# Patient Record
Sex: MALE | Race: WHITE | NOT HISPANIC OR LATINO | Employment: PART TIME | URBAN - METROPOLITAN AREA
[De-identification: names, ages, dates, MRNs, and addresses within clinical notes are randomized per-mention and may not be internally consistent; named-entity substitution may affect disease eponyms.]

---

## 2021-06-27 ENCOUNTER — HOSPITAL ENCOUNTER (EMERGENCY)
Facility: CLINIC | Age: 26
Discharge: HOME | End: 2021-06-27
Attending: FAMILY MEDICINE | Admitting: FAMILY MEDICINE
Payer: COMMERCIAL

## 2021-06-27 ENCOUNTER — APPOINTMENT (OUTPATIENT)
Dept: GENERAL RADIOLOGY | Facility: CLINIC | Age: 26
End: 2021-06-27
Attending: FAMILY MEDICINE
Payer: COMMERCIAL

## 2021-06-27 VITALS
RESPIRATION RATE: 15 BRPM | TEMPERATURE: 98.1 F | OXYGEN SATURATION: 97 % | SYSTOLIC BLOOD PRESSURE: 110 MMHG | DIASTOLIC BLOOD PRESSURE: 76 MMHG | HEIGHT: 70 IN | HEART RATE: 84 BPM | BODY MASS INDEX: 22.19 KG/M2 | WEIGHT: 155 LBS

## 2021-06-27 DIAGNOSIS — S93.104A DISLOCATED TOE, RIGHT, INITIAL ENCOUNTER: ICD-10-CM

## 2021-06-27 PROCEDURE — 28660 TREAT TOE DISLOCATION: CPT | Mod: 54 | Performed by: FAMILY MEDICINE

## 2021-06-27 PROCEDURE — 73660 X-RAY EXAM OF TOE(S): CPT | Mod: RT

## 2021-06-27 PROCEDURE — 999N000065 XR TOE PORT RIGHT G/E 2 VIEWS: Mod: RT

## 2021-06-27 PROCEDURE — 28660 TREAT TOE DISLOCATION: CPT | Mod: T9 | Performed by: FAMILY MEDICINE

## 2021-06-27 PROCEDURE — 99284 EMERGENCY DEPT VISIT MOD MDM: CPT | Mod: 25 | Performed by: FAMILY MEDICINE

## 2021-06-27 ASSESSMENT — MIFFLIN-ST. JEOR: SCORE: 1699.33

## 2021-06-27 NOTE — ED PROVIDER NOTES
"  History     Chief Complaint   Patient presents with     Toe Injury     HPI  Thuan Fuentes is a 25 year old male who presents with concerns of right fifth toe pain.  Patient states that he was doing a cartwheel when he landed he felt his foot broke and displaced medially.  Since then he has had increasing pain over the area, and swelling.  Denies previously broken any bones in his foot.  Movement makes the pain worse, he has been able to walk on it though.  Denies any other injuries.    Allergies:  Allergies   Allergen Reactions     Penicillins Hives and Rash     Rash: Rash  ITCHING: Itchin, Rash: Rash         Problem List:    There are no active problems to display for this patient.       Past Medical History:    No past medical history on file.    Past Surgical History:    No past surgical history on file.    Family History:    No family history on file.    Social History:  Marital Status:  Single [1]  Social History     Tobacco Use     Smoking status: Not on file   Substance Use Topics     Alcohol use: Not on file     Drug use: Not on file        Medications:    No current outpatient medications on file.        Review of Systems   All other systems reviewed and are negative.      Physical Exam   BP: 110/76  Pulse: 84  Temp: 98.1  F (36.7  C)  Resp: 15  Height: 177.8 cm (5' 10\")  Weight: 70.3 kg (155 lb)  SpO2: 97 %      Physical Exam  Vitals signs and nursing note reviewed.   Constitutional:       General: He is not in acute distress.     Appearance: Normal appearance. He is not ill-appearing.   Musculoskeletal:      Right foot: Decreased range of motion. Normal capillary refill. Tenderness, bony tenderness, swelling and deformity present. No crepitus or laceration.        Feet:    Neurological:      Mental Status: He is alert.         ED Course        [facility]    -Dislocation - Lower Extremity    Date/Time: 6/27/2021 2:39 PM  Performed by: Catrachito Brown, " MD  Authorized by: Catrachito Brown MD       LOCATION     Location:  Toe    Toe injury location:  Small toe    Small toe joint:  R small DIP    PRE PROCEDURE DETAILS:     Distal perfusion: normal      Range of motion: reduced    ANESTHESIA (see MAR for exact dosages)      Anesthesia method:  Nerve block    Block anesthetic:  Lidocaine 1% w/o epi    Block injection procedure:  Anatomic landmarks identified    Block outcome:  Anesthesia achieved    PROCEDURE DETAILS      Manipulation performed: yes      Toe reduction method:  Traction and counter traction and downward posterior pressure    Reduction successful: yes      Reduction confirmed with imaging: yes      Immobilization:  Strapping    POST PROCEDURE DETAILS      Neurological function: normal      Distal perfusion: normal      Range of motion: improved                  Results for orders placed or performed during the hospital encounter of 06/27/21 (from the past 24 hour(s))   XR Toe Right G/E 2 Views    Narrative    EXAM: XR TOE RIGHT G/E 2 VIEWS  LOCATION: [facility]  DATE/TIME: 06/27/2021, 1:19 PM    INDICATION: Fall. Toe trauma.  COMPARISON: None.      Impression    IMPRESSION: Dislocated right fifth toe PIP joint. Apparent dorsal displacement of the fifth toe middle phalanx relative to the proximal phalanx. Mild overriding. Adjacent soft tissue swelling. No acute displaced fracture identified.     XR Toe Port Right G/E 2 Views    Narrative    EXAM: XR TOE PORTABLE RIGHT G/E 2 VIEWS  LOCATION: [facility]  DATE/TIME: 06/27/2021, 2:20 PM    INDICATION: Post reduction.  COMPARISON: 06/27/2021.      Impression    IMPRESSION: Complete reduction of the previously seen right fifth toe PIP joint dislocation. No right fifth toe fracture identified. Joint spaces normal. Fifth toe soft tissue swelling.         Medications   lidocaine 1 % 10 mL (has no administration in time range)     X-ray shows dislocation of the DIP of the right  little toe.  Toe was reduced as noted above.  X-ray shows good reduction.  We will discharge the patient home with moises taping.  Patient will follow with podiatry.    Assessments & Plan (with Medical Decision Making)  Right fifth toe dislocation     I have reviewed the nursing notes.    I have reviewed the findings, diagnosis, plan and need for follow up with the patient.        6/27/2021   [facility] EMERGENCY DEPT     Catrachito Brown MD  06/27/21 [id number]

## 2021-06-27 NOTE — ED TRIAGE NOTES
"Was doing a one handed cart wheel yesterday and fell landing on his pinky toe and states now cannot bear much weight to the right foot.  States he can \"feel the bones shifting\"  "

## 2021-11-09 ENCOUNTER — OFFICE VISIT (OUTPATIENT)
Dept: FAMILY MEDICINE | Facility: CLINIC | Age: 26
End: 2021-11-09
Payer: COMMERCIAL

## 2021-11-09 VITALS
RESPIRATION RATE: 18 BRPM | TEMPERATURE: 98.1 F | WEIGHT: 157 LBS | SYSTOLIC BLOOD PRESSURE: 104 MMHG | HEART RATE: 78 BPM | DIASTOLIC BLOOD PRESSURE: 64 MMHG | BODY MASS INDEX: 22.53 KG/M2 | OXYGEN SATURATION: 98 %

## 2021-11-09 DIAGNOSIS — Z76.89 ENCOUNTER TO ESTABLISH CARE: Primary | ICD-10-CM

## 2021-11-09 DIAGNOSIS — F41.1 GAD (GENERALIZED ANXIETY DISORDER): ICD-10-CM

## 2021-11-09 PROCEDURE — 99204 OFFICE O/P NEW MOD 45 MIN: CPT | Performed by: STUDENT IN AN ORGANIZED HEALTH CARE EDUCATION/TRAINING PROGRAM

## 2021-11-09 RX ORDER — CITALOPRAM HYDROBROMIDE 20 MG/1
20 TABLET ORAL DAILY
Qty: 60 TABLET | Refills: 5 | Status: SHIPPED | OUTPATIENT
Start: 2021-11-09 | End: 2021-12-24

## 2021-11-09 ASSESSMENT — PAIN SCALES - GENERAL: PAINLEVEL: NO PAIN (0)

## 2021-11-09 ASSESSMENT — PATIENT HEALTH QUESTIONNAIRE - PHQ9: 5. POOR APPETITE OR OVEREATING: MORE THAN HALF THE DAYS

## 2021-11-09 ASSESSMENT — ANXIETY QUESTIONNAIRES
7. FEELING AFRAID AS IF SOMETHING AWFUL MIGHT HAPPEN: NEARLY EVERY DAY
IF YOU CHECKED OFF ANY PROBLEMS ON THIS QUESTIONNAIRE, HOW DIFFICULT HAVE THESE PROBLEMS MADE IT FOR YOU TO DO YOUR WORK, TAKE CARE OF THINGS AT HOME, OR GET ALONG WITH OTHER PEOPLE: VERY DIFFICULT
6. BECOMING EASILY ANNOYED OR IRRITABLE: SEVERAL DAYS
1. FEELING NERVOUS, ANXIOUS, OR ON EDGE: NEARLY EVERY DAY
5. BEING SO RESTLESS THAT IT IS HARD TO SIT STILL: NEARLY EVERY DAY
GAD7 TOTAL SCORE: 17
3. WORRYING TOO MUCH ABOUT DIFFERENT THINGS: NEARLY EVERY DAY
2. NOT BEING ABLE TO STOP OR CONTROL WORRYING: MORE THAN HALF THE DAYS

## 2021-11-09 NOTE — PROGRESS NOTES
Assessment & Plan     Encounter to establish care  History reviewed and updated    KEM (generalized anxiety disorder)  We will plan to start citalopram 20 mg daily and see how this goes.  I did encourage counseling but he is not willing to see them at this time.  He will continue to work on seeing when his anxiety is worse.  We will plan to follow-up in 1 month and see how things are going.  - citalopram (CELEXA) 20 MG tablet  Dispense: 60 tablet; Refill: 5      Return in about 4 weeks (around 2021) for Follow up anxiety.    Enzo Che MD  [facility]    Peter Larose is a 26 year old who presents for the following health issues      HPI     Anxiety Follow-Up    How are you doing with your anxiety since your last visit? Worsened     Are you having other symptoms that might be associated with anxiety? Yes:  hard to stay focused, mind won't shut down at night    Have you had a significant life event? OTHER: moved, had to find new job     Are you feeling depressed? No    Do you have any concerns with your use of alcohol or other drugs? No    Does have history of anxiety and depression.  Was previously treated in the past and seen counseling.  He is not interested in seeing counseling now.  Was on Zoloft in the past which he does not like how he felt on it.  Also use Atarax as needed but unsure if this helped.  He feels like his mood and depression has been fairly well controlled but anxiety has been his main issue.  No other changes or substance use.  Did quit smoking several months ago which I congratulated him on.    Kem of 17 today and PHQ-9 of 12    Social History     Tobacco Use     Smoking status: Former Smoker     Packs/day: 1.00     Years: 7.00     Pack years: 7.00     Quit date: 2021     Years since quittin.2     Smokeless tobacco: None   Vaping Use     Vaping Use: Every day   Substance Use Topics     Alcohol use: None     Drug use: None     No flowsheet  data found.  No flowsheet data found.  No flowsheet data found.        Review of Systems   Constitutional, HEENT, cardiovascular, pulmonary, gi and gu systems are negative, except as otherwise noted.      Objective    /64 (Cuff Size: Adult Regular)   Pulse 78   Temp 98.1  F (36.7  C) (Temporal)   Resp 18   Wt 71.2 kg (157 lb)   SpO2 98%   BMI 22.53 kg/m    Body mass index is 22.53 kg/m .  Physical Exam   GENERAL: healthy, alert and no distress  EYES: Eyes grossly normal to inspection, PERRL and conjunctivae and sclerae normal  HENT: hearing intact  RESP: lungs clear to auscultation - no rales, rhonchi or wheezes  CV: regular rate and rhythm, normal S1 S2, no S3 or S4, no murmur, click or rub, no peripheral edema and peripheral pulses strong  MS: no gross musculoskeletal defects noted, no edema  SKIN: no suspicious lesions or rashes  NEURO: Normal strength and tone, mentation intact and speech normal  PSYCH: mentation appears normal, affect normal/bright

## 2021-11-10 ASSESSMENT — ANXIETY QUESTIONNAIRES: GAD7 TOTAL SCORE: 17

## 2021-11-10 ASSESSMENT — PATIENT HEALTH QUESTIONNAIRE - PHQ9: SUM OF ALL RESPONSES TO PHQ QUESTIONS 1-9: 14

## 2021-12-24 ENCOUNTER — VIRTUAL VISIT (OUTPATIENT)
Dept: FAMILY MEDICINE | Facility: CLINIC | Age: 26
End: 2021-12-24
Payer: COMMERCIAL

## 2021-12-24 DIAGNOSIS — F41.1 GAD (GENERALIZED ANXIETY DISORDER): Primary | ICD-10-CM

## 2021-12-24 DIAGNOSIS — F32.0 CURRENT MILD EPISODE OF MAJOR DEPRESSIVE DISORDER, UNSPECIFIED WHETHER RECURRENT (H): ICD-10-CM

## 2021-12-24 PROCEDURE — 99213 OFFICE O/P EST LOW 20 MIN: CPT | Mod: GT | Performed by: STUDENT IN AN ORGANIZED HEALTH CARE EDUCATION/TRAINING PROGRAM

## 2021-12-24 ASSESSMENT — ANXIETY QUESTIONNAIRES
6. BECOMING EASILY ANNOYED OR IRRITABLE: NOT AT ALL
IF YOU CHECKED OFF ANY PROBLEMS ON THIS QUESTIONNAIRE, HOW DIFFICULT HAVE THESE PROBLEMS MADE IT FOR YOU TO DO YOUR WORK, TAKE CARE OF THINGS AT HOME, OR GET ALONG WITH OTHER PEOPLE: SOMEWHAT DIFFICULT
GAD7 TOTAL SCORE: 9
2. NOT BEING ABLE TO STOP OR CONTROL WORRYING: SEVERAL DAYS
7. FEELING AFRAID AS IF SOMETHING AWFUL MIGHT HAPPEN: NOT AT ALL
3. WORRYING TOO MUCH ABOUT DIFFERENT THINGS: NEARLY EVERY DAY
5. BEING SO RESTLESS THAT IT IS HARD TO SIT STILL: SEVERAL DAYS
1. FEELING NERVOUS, ANXIOUS, OR ON EDGE: NEARLY EVERY DAY

## 2021-12-24 ASSESSMENT — PAIN SCALES - GENERAL: PAINLEVEL: NO PAIN (0)

## 2021-12-24 ASSESSMENT — PATIENT HEALTH QUESTIONNAIRE - PHQ9: 5. POOR APPETITE OR OVEREATING: SEVERAL DAYS

## 2021-12-24 NOTE — PROGRESS NOTES
Thuan is a 26 year old who is being evaluated via a billable video visit.      How would you like to obtain your AVS? MyChart  If the video visit is dropped, the invitation should be resent by: Text to cell phone: [phone]  Will anyone else be joining your video visit? No      Video Start Time: 8:43 AM    Assessment & Plan     KEM (generalized anxiety disorder)  Will plan for him to see counseling. He would like to avoid other medications for the time being. If things are not improving or he decides he would like to try something different then he will let me know. Plan to continue to talk openly with family and I will provide letter for support cat which he thinks has been helpful. He will follow up as needed or for general physical in the future.   - Adult Mental Health Referral    Current mild episode of major depressive disorder, unspecified whether recurrent (H)          Return if symptoms worsen or fail to improve, for Routine preventive.    Enzo Che MD  [employer]   Thuan is a 26 year old who presents for the following health issues     HPI     Anxiety Follow-Up    How are you doing with your anxiety since your last visit? Improved     Are you having other symptoms that might be associated with anxiety? No    Have you had a significant life event? No     Are you feeling depressed? Yes:  mild    Do you have any concerns with your use of alcohol or other drugs? No    Social History     Tobacco Use     Smoking status: Former Smoker     Packs/day: 1.00     Years: 7.00     Pack years: 7.00     Quit date: 2021     Years since quittin.3     Smokeless tobacco: Never Used   Vaping Use     Vaping Use: Every day     Substances: Nicotine   Substance Use Topics     Alcohol use: Yes     Drug use: Never     KEM-7 SCORE 2021   Total Score 17 9     PHQ 2021   PHQ-9 Total Score 14   Q9: Thoughts of better off dead/self-harm past 2 weeks Not  at all     KEM-7  12/24/2021   1. Feeling nervous, anxious, or on edge 3   2. Not being able to stop or control worrying 1   3. Worrying too much about different things 3   4. Trouble relaxing 1   5. Being so restless that it is hard to sit still 1   6. Becoming easily annoyed or irritable 0   7. Feeling afraid, as if something awful might happen 0   KEM-7 Total Score 9   If you checked any problems, how difficult have they made it for you to do your work, take care of things at home, or get along with other people? Somewhat difficult     Feels like medicine med him anti-social. He tried it for 2 days and then discontinued this. Thinks anxiety and depression are slightly improved since last visit. Has been talking with family more often but no other changes since our last visit. Gets stressed out easily but no new stressors for him. No thoughts of hurting self or others. He would like letter for cat for his apartment. He has found her very calming.     Review of Systems   Constitutional, HEENT, cardiovascular, pulmonary, gi and gu systems are negative, except as otherwise noted.      Objective    Vitals - Patient Reported  Pain Score: No Pain (0)      Vitals:  No vitals were obtained today due to virtual visit.    Physical Exam   GENERAL: Healthy, alert and no distress  EYES: Eyes grossly normal to inspection.  No discharge or erythema, or obvious scleral/conjunctival abnormalities.  RESP: No audible wheeze, cough, or visible cyanosis.  No visible retractions or increased work of breathing.    SKIN: Visible skin clear. No significant rash, abnormal pigmentation or lesions.  NEURO: Cranial nerves grossly intact.  Mentation and speech appropriate for age.  PSYCH: Mentation appears normal, affect normal/bright, judgement and insight intact, normal speech and appearance well-groomed.        Video-Visit Details    Type of service:  Video Visit    Video End Time:8:51 AM    Originating Location (pt. Location):  Home    Distant Location (provider location):  [facility]     Platform used for Video Visit: Chrissy

## 2021-12-24 NOTE — LETTER
December 24, 2021      Thuan Fuentes  [address]   [place] [facility] [place] [address]        To Whom It May Concern:    Thuan Fuentes was seen in our clinic. He is currently being treated for anxiety and would benefit from having a cat as a support animal in the future. If this could be accommodated that would be greatly appreciated. Please contact our clinic with further questions.       Sincerely,        Enzo Che MD

## 2021-12-25 ASSESSMENT — ANXIETY QUESTIONNAIRES: GAD7 TOTAL SCORE: 9

## 2021-12-27 ENCOUNTER — APPOINTMENT (OUTPATIENT)
Dept: CT IMAGING | Facility: CLINIC | Age: 26
End: 2021-12-27
Attending: STUDENT IN AN ORGANIZED HEALTH CARE EDUCATION/TRAINING PROGRAM
Payer: COMMERCIAL

## 2021-12-27 ENCOUNTER — HOSPITAL ENCOUNTER (EMERGENCY)
Facility: CLINIC | Age: 26
Discharge: HOME | End: 2021-12-27
Attending: STUDENT IN AN ORGANIZED HEALTH CARE EDUCATION/TRAINING PROGRAM | Admitting: STUDENT IN AN ORGANIZED HEALTH CARE EDUCATION/TRAINING PROGRAM
Payer: COMMERCIAL

## 2021-12-27 VITALS
TEMPERATURE: 99.5 F | BODY MASS INDEX: 22.24 KG/M2 | RESPIRATION RATE: 18 BRPM | SYSTOLIC BLOOD PRESSURE: 103 MMHG | DIASTOLIC BLOOD PRESSURE: 44 MMHG | HEART RATE: 102 BPM | OXYGEN SATURATION: 99 % | WEIGHT: 155 LBS

## 2021-12-27 DIAGNOSIS — R51.9 HEADACHE, UNSPECIFIED HEADACHE TYPE: ICD-10-CM

## 2021-12-27 DIAGNOSIS — U07.1 COVID-19 VIRUS INFECTION: ICD-10-CM

## 2021-12-27 PROBLEM — R11.14 BILIOUS VOMITING WITH NAUSEA: Status: ACTIVE | Noted: 2017-07-28

## 2021-12-27 PROBLEM — R10.13 EPIGASTRIC PAIN: Status: ACTIVE | Noted: 2017-07-28

## 2021-12-27 LAB
ALBUMIN SERPL-MCNC: 3.7 G/DL (ref 3.4–5)
ALP SERPL-CCNC: 64 U/L (ref 40–150)
ALT SERPL W P-5'-P-CCNC: 44 U/L (ref 0–70)
ANION GAP SERPL CALCULATED.3IONS-SCNC: 7 MMOL/L (ref 3–14)
AST SERPL W P-5'-P-CCNC: 28 U/L (ref 0–45)
BASOPHILS # BLD AUTO: 0 10E3/UL (ref 0–0.2)
BASOPHILS NFR BLD AUTO: 0 %
BILIRUB SERPL-MCNC: 1.1 MG/DL (ref 0.2–1.3)
BUN SERPL-MCNC: 12 MG/DL (ref 7–30)
CALCIUM SERPL-MCNC: 8.9 MG/DL (ref 8.5–10.1)
CHLORIDE BLD-SCNC: 109 MMOL/L (ref 94–109)
CO2 SERPL-SCNC: 25 MMOL/L (ref 20–32)
CREAT SERPL-MCNC: 1.04 MG/DL (ref 0.66–1.25)
EOSINOPHIL # BLD AUTO: 0 10E3/UL (ref 0–0.7)
EOSINOPHIL NFR BLD AUTO: 0 %
ERYTHROCYTE [DISTWIDTH] IN BLOOD BY AUTOMATED COUNT: 13.6 % (ref 10–15)
FLUAV RNA SPEC QL NAA+PROBE: NEGATIVE
FLUBV RNA RESP QL NAA+PROBE: NEGATIVE
GFR SERPL CREATININE-BSD FRML MDRD: >90 ML/MIN/1.73M2
GLUCOSE BLD-MCNC: 85 MG/DL (ref 70–99)
HCT VFR BLD AUTO: 38.6 % (ref 40–53)
HGB BLD-MCNC: 13.2 G/DL (ref 13.3–17.7)
IMM GRANULOCYTES # BLD: 0 10E3/UL
IMM GRANULOCYTES NFR BLD: 0 %
LYMPHOCYTES # BLD AUTO: 1.5 10E3/UL (ref 0.8–5.3)
LYMPHOCYTES NFR BLD AUTO: 20 %
MCH RBC QN AUTO: 30.7 PG (ref 26.5–33)
MCHC RBC AUTO-ENTMCNC: 34.2 G/DL (ref 31.5–36.5)
MCV RBC AUTO: 90 FL (ref 78–100)
MONOCYTES # BLD AUTO: 0.9 10E3/UL (ref 0–1.3)
MONOCYTES NFR BLD AUTO: 12 %
NEUTROPHILS # BLD AUTO: 5 10E3/UL (ref 1.6–8.3)
NEUTROPHILS NFR BLD AUTO: 68 %
NRBC # BLD AUTO: 0 10E3/UL
NRBC BLD AUTO-RTO: 0 /100
PLATELET # BLD AUTO: 150 10E3/UL (ref 150–450)
POTASSIUM BLD-SCNC: 3.7 MMOL/L (ref 3.4–5.3)
PROT SERPL-MCNC: 6.8 G/DL (ref 6.8–8.8)
RBC # BLD AUTO: 4.3 10E6/UL (ref 4.4–5.9)
SARS-COV-2 RNA RESP QL NAA+PROBE: POSITIVE
SODIUM SERPL-SCNC: 141 MMOL/L (ref 133–144)
WBC # BLD AUTO: 7.4 10E3/UL (ref 4–11)

## 2021-12-27 PROCEDURE — 36415 COLL VENOUS BLD VENIPUNCTURE: CPT | Performed by: STUDENT IN AN ORGANIZED HEALTH CARE EDUCATION/TRAINING PROGRAM

## 2021-12-27 PROCEDURE — 250N000011 HC RX IP 250 OP 636: Performed by: STUDENT IN AN ORGANIZED HEALTH CARE EDUCATION/TRAINING PROGRAM

## 2021-12-27 PROCEDURE — C9803 HOPD COVID-19 SPEC COLLECT: HCPCS | Performed by: STUDENT IN AN ORGANIZED HEALTH CARE EDUCATION/TRAINING PROGRAM

## 2021-12-27 PROCEDURE — 99285 EMERGENCY DEPT VISIT HI MDM: CPT | Mod: 25 | Performed by: STUDENT IN AN ORGANIZED HEALTH CARE EDUCATION/TRAINING PROGRAM

## 2021-12-27 PROCEDURE — 87636 SARSCOV2 & INF A&B AMP PRB: CPT | Performed by: STUDENT IN AN ORGANIZED HEALTH CARE EDUCATION/TRAINING PROGRAM

## 2021-12-27 PROCEDURE — 80053 COMPREHEN METABOLIC PANEL: CPT | Performed by: STUDENT IN AN ORGANIZED HEALTH CARE EDUCATION/TRAINING PROGRAM

## 2021-12-27 PROCEDURE — 70496 CT ANGIOGRAPHY HEAD: CPT

## 2021-12-27 PROCEDURE — 96361 HYDRATE IV INFUSION ADD-ON: CPT | Performed by: STUDENT IN AN ORGANIZED HEALTH CARE EDUCATION/TRAINING PROGRAM

## 2021-12-27 PROCEDURE — 99284 EMERGENCY DEPT VISIT MOD MDM: CPT | Performed by: STUDENT IN AN ORGANIZED HEALTH CARE EDUCATION/TRAINING PROGRAM

## 2021-12-27 PROCEDURE — 258N000003 HC RX IP 258 OP 636: Performed by: STUDENT IN AN ORGANIZED HEALTH CARE EDUCATION/TRAINING PROGRAM

## 2021-12-27 PROCEDURE — 96375 TX/PRO/DX INJ NEW DRUG ADDON: CPT | Mod: 59 | Performed by: STUDENT IN AN ORGANIZED HEALTH CARE EDUCATION/TRAINING PROGRAM

## 2021-12-27 PROCEDURE — 250N000013 HC RX MED GY IP 250 OP 250 PS 637: Performed by: EMERGENCY MEDICINE

## 2021-12-27 PROCEDURE — 250N000009 HC RX 250: Performed by: STUDENT IN AN ORGANIZED HEALTH CARE EDUCATION/TRAINING PROGRAM

## 2021-12-27 PROCEDURE — 96374 THER/PROPH/DIAG INJ IV PUSH: CPT | Mod: 59 | Performed by: STUDENT IN AN ORGANIZED HEALTH CARE EDUCATION/TRAINING PROGRAM

## 2021-12-27 PROCEDURE — 85025 COMPLETE CBC W/AUTO DIFF WBC: CPT | Performed by: STUDENT IN AN ORGANIZED HEALTH CARE EDUCATION/TRAINING PROGRAM

## 2021-12-27 RX ORDER — IOPAMIDOL 755 MG/ML
500 INJECTION, SOLUTION INTRAVASCULAR ONCE
Status: COMPLETED | OUTPATIENT
Start: 2021-12-27 | End: 2021-12-27

## 2021-12-27 RX ORDER — ACETAMINOPHEN 500 MG
1000 TABLET ORAL ONCE
Status: COMPLETED | OUTPATIENT
Start: 2021-12-27 | End: 2021-12-27

## 2021-12-27 RX ORDER — DIPHENHYDRAMINE HYDROCHLORIDE 50 MG/ML
25 INJECTION INTRAMUSCULAR; INTRAVENOUS ONCE
Status: COMPLETED | OUTPATIENT
Start: 2021-12-27 | End: 2021-12-27

## 2021-12-27 RX ORDER — KETOROLAC TROMETHAMINE 15 MG/ML
15 INJECTION, SOLUTION INTRAMUSCULAR; INTRAVENOUS ONCE
Status: COMPLETED | OUTPATIENT
Start: 2021-12-27 | End: 2021-12-27

## 2021-12-27 RX ADMIN — ACETAMINOPHEN 1000 MG: 500 TABLET, FILM COATED ORAL at 16:32

## 2021-12-27 RX ADMIN — DIPHENHYDRAMINE HYDROCHLORIDE 25 MG: 50 INJECTION, SOLUTION INTRAMUSCULAR; INTRAVENOUS at 19:15

## 2021-12-27 RX ADMIN — SODIUM CHLORIDE 60 ML: 9 INJECTION, SOLUTION INTRAVENOUS at 20:30

## 2021-12-27 RX ADMIN — IOPAMIDOL 70 ML: 755 INJECTION, SOLUTION INTRAVENOUS at 20:29

## 2021-12-27 RX ADMIN — KETOROLAC TROMETHAMINE 15 MG: 15 INJECTION, SOLUTION INTRAMUSCULAR; INTRAVENOUS at 19:24

## 2021-12-27 RX ADMIN — PROCHLORPERAZINE EDISYLATE 10 MG: 5 INJECTION INTRAMUSCULAR; INTRAVENOUS at 19:18

## 2021-12-27 RX ADMIN — SODIUM CHLORIDE 1000 ML: 9 INJECTION, SOLUTION INTRAVENOUS at 19:07

## 2021-12-27 NOTE — ED TRIAGE NOTES
Pt presents with bad headache, sore throat, chills and body aches. Symptoms started this morning.

## 2022-02-06 ENCOUNTER — HEALTH MAINTENANCE LETTER (OUTPATIENT)
Age: 27
End: 2022-02-06

## 2022-04-01 ENCOUNTER — MYC MEDICAL ADVICE (OUTPATIENT)
Dept: FAMILY MEDICINE | Facility: CLINIC | Age: 27
End: 2022-04-01
Payer: COMMERCIAL

## 2022-07-20 ENCOUNTER — OFFICE VISIT (OUTPATIENT)
Dept: FAMILY MEDICINE | Facility: CLINIC | Age: 27
End: 2022-07-20
Payer: COMMERCIAL

## 2022-07-20 VITALS
BODY MASS INDEX: 22.9 KG/M2 | WEIGHT: 163.6 LBS | OXYGEN SATURATION: 97 % | HEART RATE: 100 BPM | HEIGHT: 71 IN | SYSTOLIC BLOOD PRESSURE: 104 MMHG | TEMPERATURE: 98.7 F | RESPIRATION RATE: 14 BRPM | DIASTOLIC BLOOD PRESSURE: 62 MMHG

## 2022-07-20 DIAGNOSIS — T14.8XXA PUNCTURE WOUND: Primary | ICD-10-CM

## 2022-07-20 PROCEDURE — 99212 OFFICE O/P EST SF 10 MIN: CPT | Mod: 25 | Performed by: STUDENT IN AN ORGANIZED HEALTH CARE EDUCATION/TRAINING PROGRAM

## 2022-07-20 PROCEDURE — 90471 IMMUNIZATION ADMIN: CPT | Performed by: STUDENT IN AN ORGANIZED HEALTH CARE EDUCATION/TRAINING PROGRAM

## 2022-07-20 PROCEDURE — 90715 TDAP VACCINE 7 YRS/> IM: CPT | Performed by: STUDENT IN AN ORGANIZED HEALTH CARE EDUCATION/TRAINING PROGRAM

## 2022-07-20 ASSESSMENT — PATIENT HEALTH QUESTIONNAIRE - PHQ9
SUM OF ALL RESPONSES TO PHQ QUESTIONS 1-9: 0
SUM OF ALL RESPONSES TO PHQ QUESTIONS 1-9: 0
10. IF YOU CHECKED OFF ANY PROBLEMS, HOW DIFFICULT HAVE THESE PROBLEMS MADE IT FOR YOU TO DO YOUR WORK, TAKE CARE OF THINGS AT HOME, OR GET ALONG WITH OTHER PEOPLE: NOT DIFFICULT AT ALL

## 2022-07-20 ASSESSMENT — PAIN SCALES - GENERAL: PAINLEVEL: NO PAIN (0)

## 2022-07-20 NOTE — PROGRESS NOTES
"  Assessment & Plan     Puncture wound  Patient now 5 days out appears well-healing.  Does not appear to have any other underlying vascular or nerve damage with puncture wound and I do not think it was very deep.  Tetanus updated today.  No need for empiric antibiotics.  Follow-up with new or worsening symptoms.  Further signs of infection were discussed.    Enzo Che MD  [facility] ADRI Larose is a 26 year old, presenting for the following health issues:  Foot Injury (Stepped on a screw)      History of Present Illness       Reason for visit:  Stepped on a screw  Symptom onset:  1-3 days ago  Symptoms include:  Hurt foot due to screw  Symptom intensity:  Mild  Symptom progression:  Improving  Had these symptoms before:  No  What makes it worse:  Stepping on another screw  What makes it better:  Ice cream    He eats 0-1 servings of fruits and vegetables daily.He consumes 0 sweetened beverage(s) daily.He exercises with enough effort to increase his heart rate 30 to 60 minutes per day.  He exercises with enough effort to increase his heart rate 4 days per week.   He is taking medications regularly.    Today's PHQ-9         PHQ-9 Total Score: 0    PHQ-9 Q9 Thoughts of better off dead/self-harm past 2 weeks :   Not at all    How difficult have these problems made it for you to do your work, take care of things at home, or get along with other people: Not difficult at all       Patient symptoms through that with through his flip-flop and punctured the skin.  Does not think this went very deep.  Happened 5 days ago.    Review of Systems   Constitutional, HEENT, cardiovascular, pulmonary, gi and gu systems are negative, except as otherwise noted.      Objective    /62 (BP Location: Right arm, Patient Position: Sitting, Cuff Size: Adult Regular)   Pulse 100   Temp 98.7  F (37.1  C) (Temporal)   Resp 14   Ht 1.803 m (5' 11\")   Wt 74.2 kg (163 lb 9.6 oz)   SpO2 97%   " BMI 22.82 kg/m    Body mass index is 22.82 kg/m .  Physical Exam   GENERAL: healthy, alert and no distress  EYES: Eyes grossly normal to inspection, PERRL and conjunctivae and sclerae normal  RESP: breathing comfortable   CV: no peripheral edema and peripheral pulses strong  MS: no gross musculoskeletal defects noted, no edema, ROM of toes and foot intact, normal sensation and capillary refill of right lower extremity.   SKIN: right second metatarsal puncture wound at plantar surface that appears well healing without redness, tenderness or discharge.   NEURO: Normal strength and tone, mentation intact and speech normal  PSYCH: mentation appears normal, affect normal/bright          .  ..

## 2022-10-03 ENCOUNTER — HEALTH MAINTENANCE LETTER (OUTPATIENT)
Age: 27
End: 2022-10-03

## 2022-10-12 ENCOUNTER — OFFICE VISIT (OUTPATIENT)
Dept: FAMILY MEDICINE | Facility: CLINIC | Age: 27
End: 2022-10-12
Payer: COMMERCIAL

## 2022-10-12 VITALS
SYSTOLIC BLOOD PRESSURE: 114 MMHG | TEMPERATURE: 98.2 F | WEIGHT: 171 LBS | OXYGEN SATURATION: 100 % | DIASTOLIC BLOOD PRESSURE: 72 MMHG | BODY MASS INDEX: 23.85 KG/M2 | HEART RATE: 110 BPM

## 2022-10-12 DIAGNOSIS — F32.0 CURRENT MILD EPISODE OF MAJOR DEPRESSIVE DISORDER, UNSPECIFIED WHETHER RECURRENT (H): ICD-10-CM

## 2022-10-12 DIAGNOSIS — M67.431 GANGLION CYST OF WRIST, RIGHT: Primary | ICD-10-CM

## 2022-10-12 PROCEDURE — 99213 OFFICE O/P EST LOW 20 MIN: CPT | Performed by: PHYSICIAN ASSISTANT

## 2022-10-12 ASSESSMENT — PAIN SCALES - GENERAL: PAINLEVEL: NO PAIN (0)

## 2022-10-12 NOTE — PROGRESS NOTES
Peter Larose is a 27 year old, presenting for the following health issues:  Musculoskeletal Problem      Musculoskeletal Problem    History of Present Illness       Reason for visit:  Sore forearm  Symptom onset:  1-2 weeks ago  Symptom intensity:  Mild  Symptom progression:  Worsening  Had these symptoms before:  No  What makes it worse:  Lifting things mainly using fngers  What makes it better:  No    He eats 0-1 servings of fruits and vegetables daily.He consumes 1 sweetened beverage(s) daily.He exercises with enough effort to increase his heart rate 10 to 19 minutes per day.  He exercises with enough effort to increase his heart rate 4 days per week.   He is taking medications regularly.     Patient presents today for evaluation of pain in his right wrist that extends up his forearm. This has been going on for a few weeks. 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 He denies any redness or swelling. He denies any acute injury but several months ago tried to block a soccer goal and hyperextended his hand/wrist. Has had a ganglion cyst ever since. This has gotten bigger.     Review of Systems   Constitutional, HEENT, cardiovascular, pulmonary, gi and gu systems are negative, except as otherwise noted.      Objective    Pulse 110   Temp 98.2  F (36.8  C) (Temporal)   Wt 77.6 kg (171 lb)   SpO2 100%   BMI 23.85 kg/m    Body mass index is 23.85 kg/m .  Physical Exam   GENERAL: healthy, alert and no distress  MS: Ganglion cyst over the dorsal aspect of right wrist. Full ROM of wrist. Normal strength. Radial pulse intact.   SKIN: no suspicious lesions or rashes  PSYCH: mentation appears normal, affect normal/bright        Assessment & Plan     Ganglion cyst of wrist, right  Symptoms consistent with the ganglion cyst being larger in size. Recommended seeing orthopedics at this time for further evaluation.  Referral placed as below.   - Orthopedic  Referral; Future    Current mild episode of major depressive disorder, unspecified whether recurrent (H)  Stable - working with therapy.     The patient indicates understanding of these issues and agrees with the plan.    Libra Hodges PA-C  [facility]

## 2022-11-07 ENCOUNTER — OFFICE VISIT (OUTPATIENT)
Dept: ORTHOPEDICS | Facility: CLINIC | Age: 27
End: 2022-11-07
Payer: COMMERCIAL

## 2022-11-07 ENCOUNTER — ANCILLARY PROCEDURE (OUTPATIENT)
Dept: GENERAL RADIOLOGY | Facility: CLINIC | Age: 27
End: 2022-11-07
Attending: PHYSICIAN ASSISTANT
Payer: COMMERCIAL

## 2022-11-07 VITALS
DIASTOLIC BLOOD PRESSURE: 60 MMHG | SYSTOLIC BLOOD PRESSURE: 104 MMHG | HEIGHT: 71 IN | BODY MASS INDEX: 22.69 KG/M2 | WEIGHT: 162.1 LBS

## 2022-11-07 DIAGNOSIS — M67.431 GANGLION CYST OF WRIST, RIGHT: Primary | ICD-10-CM

## 2022-11-07 DIAGNOSIS — M25.531 RIGHT WRIST PAIN: ICD-10-CM

## 2022-11-07 PROCEDURE — 20612 ASPIRATE/INJ GANGLION CYST: CPT | Mod: RT | Performed by: PHYSICIAN ASSISTANT

## 2022-11-07 PROCEDURE — 99203 OFFICE O/P NEW LOW 30 MIN: CPT | Mod: 25 | Performed by: PHYSICIAN ASSISTANT

## 2022-11-07 PROCEDURE — 73110 X-RAY EXAM OF WRIST: CPT | Mod: TC | Performed by: RADIOLOGY

## 2022-11-07 RX ORDER — TRIAMCINOLONE ACETONIDE 40 MG/ML
20 INJECTION, SUSPENSION INTRA-ARTICULAR; INTRAMUSCULAR
Status: SHIPPED | OUTPATIENT
Start: 2022-11-07

## 2022-11-07 RX ORDER — BUPIVACAINE HYDROCHLORIDE 5 MG/ML
3 INJECTION, SOLUTION PERINEURAL
Status: SHIPPED | OUTPATIENT
Start: 2022-11-07

## 2022-11-07 RX ADMIN — BUPIVACAINE HYDROCHLORIDE 3 ML: 5 INJECTION, SOLUTION PERINEURAL at 08:59

## 2022-11-07 RX ADMIN — TRIAMCINOLONE ACETONIDE 20 MG: 40 INJECTION, SUSPENSION INTRA-ARTICULAR; INTRAMUSCULAR at 08:59

## 2022-11-07 ASSESSMENT — PAIN SCALES - GENERAL: PAINLEVEL: NO PAIN (0)

## 2022-11-07 NOTE — LETTER
11/7/2022         RE: Thuan Fuentes  [address]  [facility] [id number]        Dear Colleague,    Thank you for referring your patient, Thuan Fuentes, to the [facility]. Please see a copy of my visit note below.    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  He enjoys disc golf a lot.  Used to play soccer.    He is being seen for   Chief Complaints and History of Present Illnesses   Patient presents with     Musculoskeletal Problem     Right wrist cyst     Consult     Ref: Libra Hodges     I reviewed in detail the note from MIK Hodges from 10/12/2022.    History of Present Illness:   Mechanism of Injury: No injury fall or trauma recently however when he was in 10th grade so approximately 10 years ago he hyperextended his wrist trying to block a soccer ball.  He also about 3 years ago hit heavy bag when he was sparring and hurt his right wrist at that time also there was an x-ray but no fracture diagnosed and he was just taking ibuprofen and he did get better.  Location: Currently just the distal wrist  Duration of Pain: Intermittent ganglion cyst swelling for 10 years  Rating of Pain: 0 out of 10 currently  Pain Quality: Swelling  Pain is better with: Resting the wrist  Pain is worse with: Trying to carry a heavy plate or flexing the wrist.  Treatment so far consists of: Patient's MIK Hodges on 10/12/2022 and an orthopedics consult was placed.  Patient states that the wrist has never been aspirated.  He states he has tried to smash or pop the ganglion cyst without luck and also he has tried massaging but no other treatments.  Associated Features: Denies numbness or tingling shooting burning electric pain.  Prior history of related problems: Denies that he had a previous fracture that was diagnosed although does have the above-stated  "injuries.  No other surgery on his wrist.  Pain is Limiting: Nothing other than carrying heavy plates  Here to: Orthopedic consultation  The Pain Has: Been about the same and has been intermittent  Additional History: None      Patient's past medical, surgical, social and family histories reviewed.     No past medical history on file.     No past surgical history on file.    Medications:  No current outpatient medications on file prior to visit.  No current facility-administered medications on file prior to visit.      Allergies   Allergen Reactions     Penicillins Hives and Rash     Rash: Rash  ITCHING: Itchin, Rash: Rash         Social History     Occupational History     Not on file   Tobacco Use     Smoking status: Former     Packs/day: 1.00     Years: 7.00     Pack years: 7.00     Types: Cigarettes     Quit date: 2021     Years since quittin.2     Smokeless tobacco: Never   Vaping Use     Vaping Use: Every day     Substances: Nicotine   Substance and Sexual Activity     Alcohol use: Yes     Drug use: Never     Sexual activity: Yes     Partners: Female     No pertinent family history     REVIEW OF SYSTEMS  10 point review systems performed otherwise negative as noted as per history of present illness.    Physical Exam:  Vitals: /60   Ht 1.803 m (5' 11\")   Wt 73.5 kg (162 lb 1.6 oz)   BMI 22.61 kg/m    BMI= Body mass index is 22.61 kg/m .    Constitutional: healthy, alert and no acute distress   Psychiatric: mentation appears normal and affect normal/bright  NEURO: no focal deficits, CMS intact right upper extremity  RESP: Normal with easy respirations and no use of accessory muscles to breathe, no audible wheezing or retractions  CV: +2 radial pulse and his hand is warm to palpation.   SKIN: No erythema, rashes, excoriation, or breakdown. No evidence of infection.   MUSCULOSKELETAL:    INSPECTION of right wrist: Small bump on the mid dorsal wrist more prevalent with flexion and about 2 cm in " diameter.  No other gross deformities, erythema, edema, ecchymosis, atrophy or fasciculations.     PALPATION: No tenderness to palpation of the ganglion cyst and the cyst feels spongy and soft and movable.  No tenderness to palpation of any of the bases of the metacarpals today.  No tenderness distal radius or distal ulna, no tenderness to palpation of the hand or digits or forearm.  No increased warmth.     ROM: flexion 85, extension 45, supination 90, pronation 90. The range of motion is without catching locking or pain.      STRENGTH: 5 out of 5 wrist flexion and extension, as well as  thumb and interosseous strength    SPECIAL TEST: DRUJ stable  GAIT: non-antalgic  Lymph: no palpable lymph nodes    Diagnostic Modalities:  X-rays done today and I agree with the reading below                                                                   IMPRESSION:   1. Question subtle dorsal proximal metacarpal fracture on the lateral  view only has a chronic appearance.  2. Mild anterior bowing of the pronator quadratus fat pad could be  secondary to fluid in this region.  3. No fracture or malalignment is otherwise identified.  4. Small ossification projected over the distal radial ulnar joint on  the oblique image is of doubtful clinical significance.     Independent visualization of the images was performed.    Impression: 1.  Right dorsal wrist ganglion cyst.    Plan:  All of the above pertinent physical exam and imaging modalities findings was reviewed with Thuan.    Hand / Upper Extremity Injection/Arthrocentesis: R wrist    Date/Time: 11/7/2022 8:59 AM  Performed by: Jah English PA-C  Authorized by: Jah English PA-C     Indications:  Pain  Needle Size:  25 G  Guidance: landmark    Approach:  Dorsal  Condition: carpal ganglion, dorsal      Site:  R wrist  Medications:  20 mg triamcinolone 40 MG/ML; 3 mL bupivacaine 0.5 %  Aspirate amount (mL):  0.3  Aspirate:  Gel like and yellow  Outcome:  Tolerated well,  no immediate complications  Procedure discussed: discussed risks, benefits, and alternatives    Consent Given by:  Patient  Timeout: timeout called immediately prior to procedure    Prep: patient was prepped and draped in usual sterile fashion     The skin was prepped with alcohol and betadine and then utilizing sterile technique an injection of the ganglion cyst was performed.  I injected at a 45  angle at the base of the dorsal side of the wrist.  I used marysol chloride spray prior to doing the injection.  I injected about 3 cc of half percent bupivacaine  And removed the needle.  Once good anesthetic was achieved I sprayed ethyl chloride and placed an 18-gauge needle into the cyst.  I moved the needle around but was only able to get point 3 cc of gel aspirate, then I removed the syringe and applied the steroid syringe with 0.5 cc of triamcinolone.  I injected that and he tolerated that well.  He patient tolerated the procedure well, and there were no complications. The injection site was covered with a Band-Aid.       Because of the.  He is doing okay but I can tell he is currently going to put into a donor in 2 months and then afterwards we would not want he has had stents of probably better position okay thank you    FOCUSED PLAN:  27-year-old male with approximately 10 years of intermittent right dorsal ganglion cyst.  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  With the cyst since he is having irritation when carrying heavy plates which he has to do at work we decided to try and aspirated.  We did talk about aspiration versus surgery excision of ganglion cyst.  He wanted to try an aspiration today with a  steroid injection which we proceeded with.  We were able to get a small amount of aspirate out and the cyst did seem quite smaller afterwards and we did inject some steroid.  Discussed with the patient if this comes back we can always contact Dr. Kauffman and see if he will do a ganglion cyst excision for him however I should probably see him back in clinic to make sure the cyst is operable.  Patient can follow-up on an as-needed basis.    Re-x-ray on return: No      This note was dictated with [employer].    Jah English PA-C        Again, thank you for allowing me to participate in the care of your patient.        Sincerely,        Jah English PA-C

## 2022-11-07 NOTE — PROGRESS NOTES
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  He enjoys disc golf a lot.  Used to play soccer.    He is being seen for   Chief Complaints and History of Present Illnesses   Patient presents with     Musculoskeletal Problem     Right wrist cyst     Consult     Ref: Libra Hodges     I reviewed in detail the note from MIK Hodges from 10/12/2022.    History of Present Illness:   Mechanism of Injury: No injury fall or trauma recently however when he was in 10th grade so approximately 10 years ago he hyperextended his wrist trying to block a soccer ball.  He also about 3 years ago hit heavy bag when he was sparring and hurt his right wrist at that time also there was an x-ray but no fracture diagnosed and he was just taking ibuprofen and he did get better.  Location: Currently just the distal wrist  Duration of Pain: Intermittent ganglion cyst swelling for 10 years  Rating of Pain: 0 out of 10 currently  Pain Quality: Swelling  Pain is better with: Resting the wrist  Pain is worse with: Trying to carry a heavy plate or flexing the wrist.  Treatment so far consists of: Patient's MIK Hodges on 10/12/2022 and an orthopedics consult was placed.  Patient states that the wrist has never been aspirated.  He states he has tried to smash or pop the ganglion cyst without luck and also he has tried massaging but no other treatments.  Associated Features: Denies numbness or tingling shooting burning electric pain.  Prior history of related problems: Denies that he had a previous fracture that was diagnosed although does have the above-stated injuries.  No other surgery on his wrist.  Pain is Limiting: Nothing other than carrying heavy plates  Here to: Orthopedic consultation  The Pain Has: Been about the same and has been intermittent  Additional History: None      Patient's past medical, surgical,  "social and family histories reviewed.     No past medical history on file.     No past surgical history on file.    Medications:  No current outpatient medications on file prior to visit.  No current facility-administered medications on file prior to visit.      Allergies   Allergen Reactions     Penicillins Hives and Rash     Rash: Rash  ITCHING: Itchin, Rash: Rash         Social History     Occupational History     Not on file   Tobacco Use     Smoking status: Former     Packs/day: 1.00     Years: 7.00     Pack years: 7.00     Types: Cigarettes     Quit date: 2021     Years since quittin.2     Smokeless tobacco: Never   Vaping Use     Vaping Use: Every day     Substances: Nicotine   Substance and Sexual Activity     Alcohol use: Yes     Drug use: Never     Sexual activity: Yes     Partners: Female     No pertinent family history     REVIEW OF SYSTEMS  10 point review systems performed otherwise negative as noted as per history of present illness.    Physical Exam:  Vitals: /60   Ht 1.803 m (5' 11\")   Wt 73.5 kg (162 lb 1.6 oz)   BMI 22.61 kg/m    BMI= Body mass index is 22.61 kg/m .    Constitutional: healthy, alert and no acute distress   Psychiatric: mentation appears normal and affect normal/bright  NEURO: no focal deficits, CMS intact right upper extremity  RESP: Normal with easy respirations and no use of accessory muscles to breathe, no audible wheezing or retractions  CV: +2 radial pulse and his hand is warm to palpation.   SKIN: No erythema, rashes, excoriation, or breakdown. No evidence of infection.   MUSCULOSKELETAL:    INSPECTION of right wrist: Small bump on the mid dorsal wrist more prevalent with flexion and about 2 cm in diameter.  No other gross deformities, erythema, edema, ecchymosis, atrophy or fasciculations.     PALPATION: No tenderness to palpation of the ganglion cyst and the cyst feels spongy and soft and movable.  No tenderness to palpation of any of the bases of the " metacarpals today.  No tenderness distal radius or distal ulna, no tenderness to palpation of the hand or digits or forearm.  No increased warmth.     ROM: flexion 85, extension 45, supination 90, pronation 90. The range of motion is without catching locking or pain.      STRENGTH: 5 out of 5 wrist flexion and extension, as well as  thumb and interosseous strength    SPECIAL TEST: DRUJ stable  GAIT: non-antalgic  Lymph: no palpable lymph nodes    Diagnostic Modalities:  X-rays done today and I agree with the reading below                                                                   IMPRESSION:   1. Question subtle dorsal proximal metacarpal fracture on the lateral  view only has a chronic appearance.  2. Mild anterior bowing of the pronator quadratus fat pad could be  secondary to fluid in this region.  3. No fracture or malalignment is otherwise identified.  4. Small ossification projected over the distal radial ulnar joint on  the oblique image is of doubtful clinical significance.     Independent visualization of the images was performed.    Impression: 1.  Right dorsal wrist ganglion cyst.    Plan:  All of the above pertinent physical exam and imaging modalities findings was reviewed with Thuan.    Hand / Upper Extremity Injection/Arthrocentesis: R wrist    Date/Time: 11/7/2022 8:59 AM  Performed by: Jah English PA-C  Authorized by: Jah English PA-C     Indications:  Pain  Needle Size:  25 G  Guidance: landmark    Approach:  Dorsal  Condition: carpal ganglion, dorsal      Site:  R wrist  Medications:  20 mg triamcinolone 40 MG/ML; 3 mL bupivacaine 0.5 %  Aspirate amount (mL):  0.3  Aspirate:  Gel like and yellow  Outcome:  Tolerated well, no immediate complications  Procedure discussed: discussed risks, benefits, and alternatives    Consent Given by:  Patient  Timeout: timeout called immediately prior to procedure    Prep: patient was prepped and draped in usual sterile fashion     The skin was  prepped with alcohol and betadine and then utilizing sterile technique an injection of the ganglion cyst was performed.  I injected at a 45  angle at the base of the dorsal side of the wrist.  I used marysol chloride spray prior to doing the injection.  I injected about 3 cc of half percent bupivacaine  And removed the needle.  Once good anesthetic was achieved I sprayed ethyl chloride and placed an 18-gauge needle into the cyst.  I moved the needle around but was only able to get point 3 cc of gel aspirate, then I removed the syringe and applied the steroid syringe with 0.5 cc of triamcinolone.  I injected that and he tolerated that well.  He patient tolerated the procedure well, and there were no complications. The injection site was covered with a Band-Aid.       Because of the.  He is doing okay but I can tell he is currently going to put into a donor in 2 months and then afterwards we would not want he has had stents of probably better position okay thank you    FOCUSED PLAN:  27-year-old male with approximately 10 years of intermittent right dorsal ganglion cyst.  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  With the cyst since he is having irritation when carrying heavy plates which he has to do at work we decided to try and aspirated.  We did talk about aspiration versus surgery excision of ganglion cyst.  He wanted to try an aspiration today with a steroid injection which we proceeded with.  We were able to get a small amount of aspirate out and the cyst did seem quite smaller afterwards and we did inject some steroid.  Discussed with the patient if this comes back we can always contact Dr. Kauffman and see if  he will do a ganglion cyst excision for him however I should probably see him back in clinic to make sure the cyst is operable.  Patient can follow-up on an as-needed basis.    Re-x-ray on return: No      This note was dictated with [employer].    Jah English PA-C

## 2022-12-23 ENCOUNTER — OFFICE VISIT (OUTPATIENT)
Dept: INTERNAL MEDICINE | Facility: CLINIC | Age: 27
End: 2022-12-23
Payer: COMMERCIAL

## 2022-12-23 VITALS
OXYGEN SATURATION: 99 % | BODY MASS INDEX: 22.73 KG/M2 | SYSTOLIC BLOOD PRESSURE: 108 MMHG | TEMPERATURE: 98.1 F | WEIGHT: 163 LBS | HEART RATE: 68 BPM | RESPIRATION RATE: 14 BRPM | DIASTOLIC BLOOD PRESSURE: 66 MMHG

## 2022-12-23 DIAGNOSIS — R31.0 GROSS HEMATURIA: ICD-10-CM

## 2022-12-23 DIAGNOSIS — Z11.3 SCREEN FOR STD (SEXUALLY TRANSMITTED DISEASE): Primary | ICD-10-CM

## 2022-12-23 DIAGNOSIS — Z11.59 NEED FOR HEPATITIS C SCREENING TEST: ICD-10-CM

## 2022-12-23 DIAGNOSIS — Z11.4 SCREENING FOR HIV (HUMAN IMMUNODEFICIENCY VIRUS): ICD-10-CM

## 2022-12-23 DIAGNOSIS — A74.9 CHLAMYDIA: ICD-10-CM

## 2022-12-23 LAB
ALBUMIN UR-MCNC: NEGATIVE MG/DL
AMORPH CRY #/AREA URNS HPF: ABNORMAL /HPF
APPEARANCE UR: ABNORMAL
BILIRUB UR QL STRIP: NEGATIVE
COLOR UR AUTO: YELLOW
GLUCOSE UR STRIP-MCNC: NEGATIVE MG/DL
HCV AB SERPL QL IA: NONREACTIVE
HGB UR QL STRIP: ABNORMAL
HIV 1+2 AB+HIV1 P24 AG SERPL QL IA: NONREACTIVE
KETONES UR STRIP-MCNC: NEGATIVE MG/DL
LEUKOCYTE ESTERASE UR QL STRIP: NEGATIVE
MUCOUS THREADS #/AREA URNS LPF: PRESENT /LPF
NITRATE UR QL: NEGATIVE
PH UR STRIP: 7 [PH] (ref 5–7)
RBC URINE: >182 /HPF
SP GR UR STRIP: 1.02 (ref 1–1.03)
SQUAMOUS EPITHELIAL: <1 /HPF
UROBILINOGEN UR STRIP-MCNC: NORMAL MG/DL
WBC URINE: 4 /HPF

## 2022-12-23 PROCEDURE — 99213 OFFICE O/P EST LOW 20 MIN: CPT | Performed by: INTERNAL MEDICINE

## 2022-12-23 PROCEDURE — 87491 CHLMYD TRACH DNA AMP PROBE: CPT | Performed by: INTERNAL MEDICINE

## 2022-12-23 PROCEDURE — 36415 COLL VENOUS BLD VENIPUNCTURE: CPT | Performed by: INTERNAL MEDICINE

## 2022-12-23 PROCEDURE — 87389 HIV-1 AG W/HIV-1&-2 AB AG IA: CPT | Performed by: INTERNAL MEDICINE

## 2022-12-23 PROCEDURE — 86803 HEPATITIS C AB TEST: CPT | Performed by: INTERNAL MEDICINE

## 2022-12-23 PROCEDURE — 81001 URINALYSIS AUTO W/SCOPE: CPT | Performed by: INTERNAL MEDICINE

## 2022-12-23 PROCEDURE — 87591 N.GONORRHOEAE DNA AMP PROB: CPT | Performed by: INTERNAL MEDICINE

## 2022-12-23 NOTE — PROGRESS NOTES
Assessment & Plan   Problem List Items Addressed This Visit    None  Visit Diagnoses     Screen for STD (sexually transmitted disease)    -  Primary    Relevant Orders    HIV Antigen Antibody Combo    Hepatitis C Screen Reflex to HCV RNA Quant and Genotype    NEISSERIA GONORRHOEA PCR    CHLAMYDIA TRACHOMATIS PCR    Screening for HIV (human immunodeficiency virus)        Relevant Orders    HIV Antigen Antibody Combo    Need for hepatitis C screening test        Relevant Orders    Hepatitis C Screen Reflex to HCV RNA Quant and Genotype    Gross hematuria        Relevant Orders    UA Macro with Reflex to Micro and Culture - lab collect         Patient is 27-year-old male who is normally healthy, works at [employer], smokes a little bit.  He is single.  At risk for STDs and wants to be screened.  He denies any symptoms but did have low blood in his sperm the other day.  We will check a urinalysis, will check for gonorrhea, chlamydia, HIV and hepatitis C.  Discussed herpes has no screening test and recommended to wear condoms.  Patient has female partners and denies wanting syphilis testing today.               No follow-ups on file.    Wing Merrill MD  [facility]    Peter Larose is a 27 year old, presenting for the following health issues:  Exposure to STD    History of Present Illness       Reason for visit:  Testing    He eats 0-1 servings of fruits and vegetables daily.He consumes 1 sweetened beverage(s) daily.He exercises with enough effort to increase his heart rate 10 to 19 minutes per day.  He exercises with enough effort to increase his heart rate 3 or less days per week.   He is taking medications regularly.     Wants to have STD testing, no particular exposure.  No symptoms      Review of Systems         Objective    /66   Pulse 68   Temp 98.1  F (36.7  C) (Temporal)   Resp 14   Wt 73.9 kg (163 lb)   SpO2 99%   BMI 22.73 kg/m    There is no height or weight on file  to calculate BMI.  Physical Exam   NAD  Skin tattoos

## 2022-12-24 LAB
C TRACH DNA SPEC QL NAA+PROBE: POSITIVE
N GONORRHOEA DNA SPEC QL NAA+PROBE: NEGATIVE

## 2022-12-26 RX ORDER — DOXYCYCLINE 100 MG/1
100 CAPSULE ORAL 2 TIMES DAILY
Qty: 14 CAPSULE | Refills: 0 | Status: SHIPPED | OUTPATIENT
Start: 2022-12-26

## 2023-02-11 ENCOUNTER — HEALTH MAINTENANCE LETTER (OUTPATIENT)
Age: 28
End: 2023-02-11

## 2023-05-15 ENCOUNTER — VIRTUAL VISIT (OUTPATIENT)
Dept: FAMILY MEDICINE | Facility: CLINIC | Age: 28
End: 2023-05-15
Payer: COMMERCIAL

## 2023-05-15 ENCOUNTER — TELEPHONE (OUTPATIENT)
Dept: FAMILY MEDICINE | Facility: OTHER | Age: 28
End: 2023-05-15

## 2023-05-15 DIAGNOSIS — Z86.39 HX OF DEHYDRATION: Primary | ICD-10-CM

## 2023-05-15 PROBLEM — F32.0 CURRENT MILD EPISODE OF MAJOR DEPRESSIVE DISORDER, UNSPECIFIED WHETHER RECURRENT (H): Status: RESOLVED | Noted: 2022-10-12 | Resolved: 2023-05-15

## 2023-05-15 PROCEDURE — 99213 OFFICE O/P EST LOW 20 MIN: CPT | Mod: VID | Performed by: FAMILY MEDICINE

## 2023-05-15 ASSESSMENT — PATIENT HEALTH QUESTIONNAIRE - PHQ9
10. IF YOU CHECKED OFF ANY PROBLEMS, HOW DIFFICULT HAVE THESE PROBLEMS MADE IT FOR YOU TO DO YOUR WORK, TAKE CARE OF THINGS AT HOME, OR GET ALONG WITH OTHER PEOPLE: NOT DIFFICULT AT ALL
SUM OF ALL RESPONSES TO PHQ QUESTIONS 1-9: 0
SUM OF ALL RESPONSES TO PHQ QUESTIONS 1-9: 0

## 2023-05-15 NOTE — LETTER
May 15, 2023      Thuan Fuentes  [address]   [facility] [address]        To Whom It May Concern:    Thuan Fuentes was seen in our clinic. He may return to work, but should be allowed to wear shorts and/or allowed a 10 minute break to drink fluids/cool down if needed because of heat intolerance / past medical history.      Sincerely,        Daryn Khanna MD

## 2023-05-15 NOTE — PROGRESS NOTES
"Thuan is a 27 year old who is being evaluated via a billable video visit.      How would you like to obtain your AVS? MyChart  If the video visit is dropped, the invitation should be resent by: Text to cell phone: [phone]  Will anyone else be joining your video visit? No    Assessment & Plan   1. 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  Patient is understanding and states that this is the case he may seek employment elsewhere.  He will take the letter however.    Daryn Khanna MD  [facility]    Disclaimer: This note consists of symbols derived from keyboarding, dictation and/or voice recognition software. As a result, there may be errors in the script that have gone undetected. Please consider this when interpreting information found in this chart.      Subjective   Thuan is a 27 year old, presenting for the following health issues:  Letter for School/Work    Works as . Has had episodes of heat stroke/pre-syncope years ago.  No air conditioning in current working environment.    \"Triage note: Patient scheduled for virtual appointment today for heat stroke. Provider requesting triage as likely not appropriate for virtual visit.         Called and spoke with patient. He is not having heat stroke symptoms currently.   He reports as a child he did have this occur a couple times.      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   His employer does not allow him to wear shorts and has no way of " "cooling off when working.      Patient was wanting to have a virtual visit to have a letter written indicating patient needs to be able to wear shorts while working.\"      History of Present Illness       Reason for visit:  Doctors note    He eats 2-3 servings of fruits and vegetables daily.He consumes 1 sweetened beverage(s) daily.He exercises with enough effort to increase his heart rate 20 to 29 minutes per day.  He exercises with enough effort to increase his heart rate 4 days per week.   He is taking medications regularly.    Today's PHQ-9         PHQ-9 Total Score: 0    PHQ-9 Q9 Thoughts of better off dead/self-harm past 2 weeks :   Not at all    How difficult have these problems made it for you to do your work, take care of things at home, or get along with other people: Not difficult at all     Review of Systems   Constitutional, HEENT, cardiovascular, pulmonary, GI, , musculoskeletal, neuro, skin, endocrine and psych systems are negative, except as otherwise noted.      Objective           Vitals:  No vitals were obtained today due to virtual visit.    Physical Exam   GENERAL: Healthy, alert and no distress  EYES: Eyes grossly normal to inspection.  No discharge or erythema, or obvious scleral/conjunctival abnormalities.  RESP: No audible wheeze, cough, or visible cyanosis.  No visible retractions or increased work of breathing.    SKIN: Visible skin clear. No significant rash, abnormal pigmentation or lesions.  NEURO: Cranial nerves grossly intact.  Mentation and speech appropriate for age.  PSYCH: Mentation appears normal, affect normal/bright, judgement and insight intact, normal speech and appearance well-groomed.    Labs: None    Video-Visit Details    Type of service:  Video Visit   Video Start Time: 3:44 PM  Video End Time:2:44 PM    Originating Location (pt. Location): Home    Distant Location (provider location):  On-site  Platform used for Video Visit: Chrissy    "

## 2023-05-15 NOTE — TELEPHONE ENCOUNTER
Patient scheduled for virtual appointment today for heat stroke. Provider requesting triage as likely not appropriate for virtual visit.       Called and spoke with patient. He is not having heat stroke symptoms currently.   He reports as a child he did have this occur a couple times.     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   His employer does not allow him to wear shorts and has no way of cooling off when working.     Patient was wanting to have a virtual visit to have a letter written indicating patient needs to be able to wear shorts while working.     Routing to provider to determine if okay to keep virtual visit.     Tess VÁSQUEZN, RN  [facility]

## 2023-05-19 NOTE — TELEPHONE ENCOUNTER
Patient had virtual visit with provider 5/15/23.     Closing encounter.     Tess VÁSQUEZN, RN  [facility]

## 2024-03-09 ENCOUNTER — HEALTH MAINTENANCE LETTER (OUTPATIENT)
Age: 29
End: 2024-03-09

## 2025-03-16 ENCOUNTER — HEALTH MAINTENANCE LETTER (OUTPATIENT)
Age: 30
End: 2025-03-16